# Patient Record
Sex: MALE | Race: WHITE | URBAN - NONMETROPOLITAN AREA
[De-identification: names, ages, dates, MRNs, and addresses within clinical notes are randomized per-mention and may not be internally consistent; named-entity substitution may affect disease eponyms.]

---
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
## 2024-08-20 ENCOUNTER — OFFICE VISIT (OUTPATIENT)
Age: 19
End: 2024-08-20

## 2024-08-20 VITALS
TEMPERATURE: 97.7 F | WEIGHT: 231 LBS | SYSTOLIC BLOOD PRESSURE: 131 MMHG | HEART RATE: 71 BPM | OXYGEN SATURATION: 98 % | DIASTOLIC BLOOD PRESSURE: 86 MMHG

## 2024-08-20 DIAGNOSIS — Z02.5 ROUTINE SPORTS PHYSICAL EXAM: Primary | ICD-10-CM

## 2024-08-20 NOTE — PROGRESS NOTES
[facility]  EVARISTO AMIN [address] [address]  Dept: [phone]    HPI:   Pre-participation exam for esports - no complaints.     Current Outpatient Medications   Medication Sig Dispense Refill    levocetirizine (XYZAL) 5 MG tablet Take 5 mg by mouth nightly (Patient not taking: Reported on 8/20/2024)       No current facility-administered medications for this visit.     No Known Allergies    PAST MEDICAL HISTORY   No past medical history on file.    SURGICAL HISTORY    No past surgical history on file.    FAMILY HISTORY    No family history on file.      Review of Systems:  Respiratory: Negative for shortness of breath or wheezing.   Cardiovascular: Negative for chest pain or palpitations.   Musculoskeletal: Negative for back pain, joint swelling and arthralgias.   Neurological: Negative for headaches. No history of concussion.    No history of SOB/CP/dizziness with activity. No fainting with activity. No family history of sudden death or heart attack before age 55.        Objective:      /86 (Position: Sitting)   Pulse 71   Temp 97.7 °F (36.5 °C)   Wt 104.8 kg (231 lb)   SpO2 98%     Physical Exam:   Constitutional: Phillip Jordan appears well-developed and well-nourished.  TM's: normal bilaterally  Nose: No nasal discharge.   Mouth/Throat: Mucous membranes are moist. Oropharynx is clear. Pharynx is normal.   Eyes: EOM are normal. Pupils are equal, round, and reactive to light.   Neck: Thyroid normal. No adenopathy.   Cardiovascular: Regular rhythm, nl S1 and S2. No murmur heard. Pulses symmetric.  Pulmonary/Chest: Breath sounds normal, no wheeze.   Abdomen: No mass or tenderness. BS normal.  Spine: No scoliosis.   Musc: 5/5 strength in UE and LE bilaterally; duck walk normal. Toe walking and heel walking normal.    Assessment:      Normal physical examination    Diagnosis Orders   1. Routine sports